# Patient Record
Sex: MALE | Race: WHITE | NOT HISPANIC OR LATINO | URBAN - METROPOLITAN AREA
[De-identification: names, ages, dates, MRNs, and addresses within clinical notes are randomized per-mention and may not be internally consistent; named-entity substitution may affect disease eponyms.]

---

## 2018-10-17 ENCOUNTER — EMERGENCY (EMERGENCY)
Facility: HOSPITAL | Age: 43
LOS: 1 days | Discharge: HOME | End: 2018-10-17
Attending: EMERGENCY MEDICINE
Payer: COMMERCIAL

## 2018-10-17 VITALS
OXYGEN SATURATION: 98 % | SYSTOLIC BLOOD PRESSURE: 139 MMHG | RESPIRATION RATE: 18 BRPM | HEART RATE: 79 BPM | TEMPERATURE: 98 F | DIASTOLIC BLOOD PRESSURE: 82 MMHG

## 2018-10-17 VITALS — HEIGHT: 74 IN | WEIGHT: 315 LBS

## 2018-10-17 LAB
ALBUMIN SERPL ELPH-MCNC: 3.6 G/DL — SIGNIFICANT CHANGE UP (ref 3.5–5)
ALP SERPL-CCNC: 86 U/L — SIGNIFICANT CHANGE UP (ref 40–120)
ALT FLD-CCNC: 25 U/L DA — SIGNIFICANT CHANGE UP (ref 10–60)
ANION GAP SERPL CALC-SCNC: 7 MMOL/L — SIGNIFICANT CHANGE UP (ref 5–17)
APPEARANCE UR: CLEAR — SIGNIFICANT CHANGE UP
AST SERPL-CCNC: 14 U/L — SIGNIFICANT CHANGE UP (ref 10–40)
BASOPHILS # BLD AUTO: 0.1 K/UL — SIGNIFICANT CHANGE UP (ref 0–0.2)
BASOPHILS NFR BLD AUTO: 1 % — SIGNIFICANT CHANGE UP (ref 0–2)
BILIRUB SERPL-MCNC: 0.3 MG/DL — SIGNIFICANT CHANGE UP (ref 0.2–1.2)
BILIRUB UR-MCNC: NEGATIVE — SIGNIFICANT CHANGE UP
BUN SERPL-MCNC: 22 MG/DL — HIGH (ref 7–18)
CALCIUM SERPL-MCNC: 8.8 MG/DL — SIGNIFICANT CHANGE UP (ref 8.4–10.5)
CHLORIDE SERPL-SCNC: 107 MMOL/L — SIGNIFICANT CHANGE UP (ref 96–108)
CO2 SERPL-SCNC: 27 MMOL/L — SIGNIFICANT CHANGE UP (ref 22–31)
COLOR SPEC: YELLOW — SIGNIFICANT CHANGE UP
CREAT SERPL-MCNC: 0.99 MG/DL — SIGNIFICANT CHANGE UP (ref 0.5–1.3)
DIFF PNL FLD: ABNORMAL
EOSINOPHIL # BLD AUTO: 0.3 K/UL — SIGNIFICANT CHANGE UP (ref 0–0.5)
EOSINOPHIL NFR BLD AUTO: 2 % — SIGNIFICANT CHANGE UP (ref 0–6)
GLUCOSE SERPL-MCNC: 95 MG/DL — SIGNIFICANT CHANGE UP (ref 70–99)
GLUCOSE UR QL: NEGATIVE — SIGNIFICANT CHANGE UP
HCT VFR BLD CALC: 46.1 % — SIGNIFICANT CHANGE UP (ref 39–50)
HGB BLD-MCNC: 14.8 G/DL — SIGNIFICANT CHANGE UP (ref 13–17)
KETONES UR-MCNC: NEGATIVE — SIGNIFICANT CHANGE UP
LEUKOCYTE ESTERASE UR-ACNC: ABNORMAL
LYMPHOCYTES # BLD AUTO: 2.9 K/UL — SIGNIFICANT CHANGE UP (ref 1–3.3)
LYMPHOCYTES # BLD AUTO: 22.7 % — SIGNIFICANT CHANGE UP (ref 13–44)
MCHC RBC-ENTMCNC: 28.8 PG — SIGNIFICANT CHANGE UP (ref 27–34)
MCHC RBC-ENTMCNC: 32.2 GM/DL — SIGNIFICANT CHANGE UP (ref 32–36)
MCV RBC AUTO: 89.7 FL — SIGNIFICANT CHANGE UP (ref 80–100)
MONOCYTES # BLD AUTO: 0.9 K/UL — SIGNIFICANT CHANGE UP (ref 0–0.9)
MONOCYTES NFR BLD AUTO: 7.2 % — SIGNIFICANT CHANGE UP (ref 2–14)
NEUTROPHILS # BLD AUTO: 8.6 K/UL — HIGH (ref 1.8–7.4)
NEUTROPHILS NFR BLD AUTO: 67 % — SIGNIFICANT CHANGE UP (ref 43–77)
NITRITE UR-MCNC: NEGATIVE — SIGNIFICANT CHANGE UP
PH UR: 6 — SIGNIFICANT CHANGE UP (ref 5–8)
PLATELET # BLD AUTO: 287 K/UL — SIGNIFICANT CHANGE UP (ref 150–400)
POTASSIUM SERPL-MCNC: 4.5 MMOL/L — SIGNIFICANT CHANGE UP (ref 3.5–5.3)
POTASSIUM SERPL-SCNC: 4.5 MMOL/L — SIGNIFICANT CHANGE UP (ref 3.5–5.3)
PROT SERPL-MCNC: 8.2 G/DL — SIGNIFICANT CHANGE UP (ref 6–8.3)
PROT UR-MCNC: 30 MG/DL
RBC # BLD: 5.14 M/UL — SIGNIFICANT CHANGE UP (ref 4.2–5.8)
RBC # FLD: 13.4 % — SIGNIFICANT CHANGE UP (ref 10.3–14.5)
SODIUM SERPL-SCNC: 141 MMOL/L — SIGNIFICANT CHANGE UP (ref 135–145)
SP GR SPEC: 1.02 — SIGNIFICANT CHANGE UP (ref 1.01–1.02)
UROBILINOGEN FLD QL: NEGATIVE — SIGNIFICANT CHANGE UP
WBC # BLD: 12.6 K/UL — HIGH (ref 3.8–10.5)
WBC # FLD AUTO: 12.6 K/UL — HIGH (ref 3.8–10.5)

## 2018-10-17 PROCEDURE — 87591 N.GONORRHOEAE DNA AMP PROB: CPT

## 2018-10-17 PROCEDURE — 87491 CHLMYD TRACH DNA AMP PROBE: CPT

## 2018-10-17 PROCEDURE — 85027 COMPLETE CBC AUTOMATED: CPT

## 2018-10-17 PROCEDURE — 87086 URINE CULTURE/COLONY COUNT: CPT

## 2018-10-17 PROCEDURE — 76870 US EXAM SCROTUM: CPT

## 2018-10-17 PROCEDURE — 99285 EMERGENCY DEPT VISIT HI MDM: CPT

## 2018-10-17 PROCEDURE — 76705 ECHO EXAM OF ABDOMEN: CPT

## 2018-10-17 PROCEDURE — 80053 COMPREHEN METABOLIC PANEL: CPT

## 2018-10-17 PROCEDURE — 96374 THER/PROPH/DIAG INJ IV PUSH: CPT

## 2018-10-17 PROCEDURE — 81001 URINALYSIS AUTO W/SCOPE: CPT

## 2018-10-17 PROCEDURE — 76705 ECHO EXAM OF ABDOMEN: CPT | Mod: 26

## 2018-10-17 PROCEDURE — 87186 SC STD MICRODIL/AGAR DIL: CPT

## 2018-10-17 PROCEDURE — 99284 EMERGENCY DEPT VISIT MOD MDM: CPT | Mod: 25

## 2018-10-17 PROCEDURE — 76870 US EXAM SCROTUM: CPT | Mod: 26

## 2018-10-17 RX ORDER — KETOROLAC TROMETHAMINE 30 MG/ML
30 SYRINGE (ML) INJECTION ONCE
Qty: 0 | Refills: 0 | Status: DISCONTINUED | OUTPATIENT
Start: 2018-10-17 | End: 2018-10-17

## 2018-10-17 RX ORDER — IBUPROFEN 200 MG
1 TABLET ORAL
Qty: 20 | Refills: 0
Start: 2018-10-17 | End: 2018-10-21

## 2018-10-17 RX ORDER — CIPROFLOXACIN LACTATE 400MG/40ML
1 VIAL (ML) INTRAVENOUS
Qty: 10 | Refills: 0
Start: 2018-10-17 | End: 2018-10-26

## 2018-10-17 RX ADMIN — Medication 30 MILLIGRAM(S): at 15:36

## 2018-10-17 NOTE — ED PROVIDER NOTE - ATTENDING CONTRIBUTION TO CARE
I conducted a face-to-face interaction with patient and I agree with NP documentation and plan.  Pt presents R testicular swelling and heaviness x 2 days  Exam:  Abd: soft/nontender, nondistended  : right testicular fullness, nontender, no  lesions, penis normal with no discharge; no apparent inguinal hernia on exam  A/P: Right testicular pain--workup shows mild leukocytosis, UA c/w UTI, testicular US with no torsion or significant pathology--GC/Chlamydia sent but clinically unlikely, therefore will treat with Levaquin, return precautions, PMD f/u

## 2018-10-17 NOTE — ED PROVIDER NOTE - OBJECTIVE STATEMENT
45 year-old 43 year-old male, history of obesity, presents with cc R testicular swelling/heaviness x 2 days. Reports RLQ pain x 2 weeks that spontaneously resolved but triggered R testicular symptoms. Associated with urinary hesitancy, dysuria and intermittent hematuria x 2 weeks. Patient reports that R testicular pain is aggravated when lifting heavy and partially alleviated when laying down. Denies fever, chills, current abdo pain, N/V, back pain, history of hernias, history of epididymitis or any other complaints. Sexually active. Works as  and lifts heavy from time to time.

## 2018-10-17 NOTE — ED PROVIDER NOTE - PROGRESS NOTE DETAILS
Significant relief of pain after Toradol. Mild elevated WBC 12.6. otherwise blood work non-concerning. UA shows signs of infection. US unremarkable no signs of torsion/epididymitis/hernia in scrotum. Low risk for GC/Chlam (cultures pending). Will cover with Levaquin. Patient will need to follow up with the urologist within 1 week and with the gen surgeon for evaluation of hernia. Pt is well appearing walking with steady gait, stable for discharge and follow up without fail with medical doctor. I had a detailed discussion with the patient and/or guardian regarding the historical points, exam findings, and any diagnostic results supporting the discharge diagnosis. Pt educated on care and need for follow up. Strict return instructions and red flag signs and symptoms discussed with patient. Questions answered. Pt shows understanding of discharge information and agrees to follow.

## 2018-10-17 NOTE — ED STATDOCS - OBJECTIVE STATEMENT
Telemedicine assessment was conducted (using real time 2 way audio-video technology) by Dr. Jesus Lemos located at [address] [place] [id number]  +++++++++++++++++++++++++++++++++++++++++++++++++++  History and Plan: 44 y/o M presents to the ED with complaints of right testicular pain. Patient reports two week history of dysuria and sensation of incomplete bladder emptying. Patient states symptoms resolved two days ago. Patient has since experienced right testicular swelling and hematuria. Patient reports resolution of right lower abdominal pain. Patient denies injury, similar episode in the past, prior hernia or any other complaints. NKDA.   Plan: Patient to be examined by provider on site. Will obtain urinalysis.     Patient seen by me in triage for initial assessment and ordering. Physician on site to follow results and further evaluate and treat patient. Telemedicine assessment was conducted (using real time 2 way audio-video technology) by Dr. Jesus Lemos located at [address] [place] [id number]  +++++++++++++++++++++++++++++++++++++++++++++++++++  History and Plan: 44 y/o M presents to the ED with complaints of right testicular pain. Patient reports two week history of dysuria and sensation of incomplete bladder emptying. Patient states symptoms resolved two days ago. Patient has since experienced right testicular swelling and hematuria. Patient reports resolution of right lower abdominal pain. Patient denies injury, similar episode in the past, prior hernia or any other complaints. NKDA.   Plan: Patient to be examined by provider on site. Will obtain urinalysis.     needs physical exam to determine if further workup needed; on site provider to evaluate patient    Patient seen by me in triage for initial assessment and ordering. Physician on site to follow results and further evaluate and treat patient.

## 2018-10-17 NOTE — ED ADULT NURSE NOTE - NSIMPLEMENTINTERV_GEN_ALL_ED
Implemented All Universal Safety Interventions:  [place] to call system. Call bell, personal items and telephone within reach. Instruct patient to call for assistance. Room bathroom lighting operational. Non-slip footwear when patient is off stretcher. Physically safe environment: no spills, clutter or unnecessary equipment. Stretcher in lowest position, wheels locked, appropriate side rails in place.

## 2018-10-17 NOTE — ED ADULT TRIAGE NOTE - CHIEF COMPLAINT QUOTE
C/o right testicular pain since y/d after lifting a laundry bag. When I urinated this morning the urine was brown at first and their was blood in it

## 2018-10-17 NOTE — ED PROVIDER NOTE - PHYSICAL EXAMINATION
No CVAT. No inguinal tenderness, lesions, lymphadenopathy or bulging (with or without coughing). Foreskin retracts easily, glans penis normal. No evidence of phimosis, paraphimosis or balanitis. Urinary meatus clear without discharge or erythema. Penile shaft without lesions, swelling or tenderness. R sided scrotal swelling/fullness without increased warmth, redness or tenderness to palpation. No induration or crepitus. Testes in normal position, not high-riding and non-tender. No localized tenderness over the upper pole of testes. Normal cremasteric reflex.

## 2018-10-17 NOTE — ED PROVIDER NOTE - MEDICAL DECISION MAKING DETAILS
43 year-old female, presents with cc R testicular pain x 2 days. Well-appearing, slightly hypertensive likely from discomfort. Based on history and exam suggestive of non-incarcerated non-strangulated hernia. Ddx: epididymitis, UTI, BPH. Plan: labs, urine, US, re-assess.

## 2018-10-18 LAB
C TRACH RRNA SPEC QL NAA+PROBE: SIGNIFICANT CHANGE UP
N GONORRHOEA RRNA SPEC QL NAA+PROBE: SIGNIFICANT CHANGE UP
SPECIMEN SOURCE: SIGNIFICANT CHANGE UP

## 2018-10-23 ENCOUNTER — APPOINTMENT (OUTPATIENT)
Dept: UROLOGY | Facility: CLINIC | Age: 43
End: 2018-10-23
Payer: COMMERCIAL

## 2018-10-23 VITALS
HEIGHT: 74 IN | RESPIRATION RATE: 18 BRPM | SYSTOLIC BLOOD PRESSURE: 140 MMHG | HEART RATE: 95 BPM | OXYGEN SATURATION: 97 % | WEIGHT: 315 LBS | BODY MASS INDEX: 40.43 KG/M2 | DIASTOLIC BLOOD PRESSURE: 98 MMHG

## 2018-10-23 DIAGNOSIS — F17.200 NICOTINE DEPENDENCE, UNSPECIFIED, UNCOMPLICATED: ICD-10-CM

## 2018-10-23 DIAGNOSIS — Z80.6 FAMILY HISTORY OF LEUKEMIA: ICD-10-CM

## 2018-10-23 PROBLEM — Z00.00 ENCOUNTER FOR PREVENTIVE HEALTH EXAMINATION: Status: ACTIVE | Noted: 2018-10-23

## 2018-10-23 LAB
ANION GAP SERPL CALC-SCNC: 13 MMOL/L
BUN SERPL-MCNC: 17 MG/DL
CALCIUM SERPL-MCNC: 9.2 MG/DL
CHLORIDE SERPL-SCNC: 104 MMOL/L
CO2 SERPL-SCNC: 25 MMOL/L
CREAT SERPL-MCNC: 1.03 MG/DL
GLUCOSE SERPL-MCNC: 103 MG/DL
POTASSIUM SERPL-SCNC: 5.2 MMOL/L
SODIUM SERPL-SCNC: 142 MMOL/L

## 2018-10-23 PROCEDURE — 99406 BEHAV CHNG SMOKING 3-10 MIN: CPT

## 2018-10-23 PROCEDURE — 99204 OFFICE O/P NEW MOD 45 MIN: CPT | Mod: 25

## 2018-10-24 LAB
APPEARANCE: ABNORMAL
BACTERIA: NEGATIVE
BILIRUBIN URINE: NEGATIVE
BLOOD URINE: NEGATIVE
COLOR: ABNORMAL
GLUCOSE QUALITATIVE U: NEGATIVE MG/DL
HYALINE CASTS: 0 /LPF
KETONES URINE: ABNORMAL
LEUKOCYTE ESTERASE URINE: NEGATIVE
MICROSCOPIC-UA: NORMAL
NITRITE URINE: NEGATIVE
PH URINE: 5
PROTEIN URINE: ABNORMAL MG/DL
PSA FREE FLD-MCNC: 10.1
PSA FREE SERPL-MCNC: 0.08 NG/ML
PSA SERPL-MCNC: 0.79 NG/ML
RED BLOOD CELLS URINE: 1 /HPF
SPECIFIC GRAVITY URINE: 1.03
SQUAMOUS EPITHELIAL CELLS: 8 /HPF
URINE COMMENTS: NORMAL
UROBILINOGEN URINE: NEGATIVE MG/DL
WHITE BLOOD CELLS URINE: 6 /HPF

## 2018-10-26 ENCOUNTER — RESULT REVIEW (OUTPATIENT)
Age: 43
End: 2018-10-26

## 2018-10-26 LAB
BACTERIA UR CULT: ABNORMAL
CORE LAB FLUID CYTOLOGY: NORMAL

## 2018-10-26 RX ORDER — AMOXICILLIN AND CLAVULANATE POTASSIUM 875; 125 MG/1; MG/1
875-125 TABLET, COATED ORAL
Qty: 6 | Refills: 0 | Status: ACTIVE | COMMUNITY
Start: 2018-10-26 | End: 1900-01-01

## 2018-10-27 ENCOUNTER — APPOINTMENT (OUTPATIENT)
Dept: CT IMAGING | Facility: IMAGING CENTER | Age: 43
End: 2018-10-27

## 2018-10-27 ENCOUNTER — OUTPATIENT (OUTPATIENT)
Dept: OUTPATIENT SERVICES | Facility: HOSPITAL | Age: 43
LOS: 1 days | End: 2018-10-27
Payer: MEDICAID

## 2018-10-27 DIAGNOSIS — Z00.8 ENCOUNTER FOR OTHER GENERAL EXAMINATION: ICD-10-CM

## 2018-10-27 PROCEDURE — 74178 CT ABD&PLV WO CNTR FLWD CNTR: CPT | Mod: 26

## 2018-10-27 PROCEDURE — 74178 CT ABD&PLV WO CNTR FLWD CNTR: CPT

## 2018-11-14 ENCOUNTER — APPOINTMENT (OUTPATIENT)
Dept: UROLOGY | Facility: CLINIC | Age: 43
End: 2018-11-14
Payer: COMMERCIAL

## 2018-11-14 VITALS
HEIGHT: 74 IN | SYSTOLIC BLOOD PRESSURE: 136 MMHG | BODY MASS INDEX: 40.43 KG/M2 | WEIGHT: 315 LBS | TEMPERATURE: 98.7 F | DIASTOLIC BLOOD PRESSURE: 70 MMHG

## 2018-11-14 DIAGNOSIS — Z87.440 PERSONAL HISTORY OF URINARY (TRACT) INFECTIONS: ICD-10-CM

## 2018-11-14 PROCEDURE — 99214 OFFICE O/P EST MOD 30 MIN: CPT | Mod: 25

## 2018-11-14 PROCEDURE — 52000 CYSTOURETHROSCOPY: CPT

## 2019-01-21 ENCOUNTER — EMERGENCY (EMERGENCY)
Facility: HOSPITAL | Age: 44
LOS: 1 days | Discharge: HOME | End: 2019-01-21
Attending: EMERGENCY MEDICINE
Payer: COMMERCIAL

## 2019-01-21 VITALS
RESPIRATION RATE: 16 BRPM | SYSTOLIC BLOOD PRESSURE: 164 MMHG | HEART RATE: 114 BPM | TEMPERATURE: 99 F | DIASTOLIC BLOOD PRESSURE: 76 MMHG | OXYGEN SATURATION: 96 %

## 2019-01-21 VITALS — HEIGHT: 75 IN | WEIGHT: 296.96 LBS

## 2019-01-21 PROCEDURE — 82962 GLUCOSE BLOOD TEST: CPT

## 2019-01-21 PROCEDURE — 10160 PNXR ASPIR ABSC HMTMA BULLA: CPT

## 2019-01-21 PROCEDURE — 99283 EMERGENCY DEPT VISIT LOW MDM: CPT

## 2019-01-21 PROCEDURE — 99283 EMERGENCY DEPT VISIT LOW MDM: CPT | Mod: 25

## 2019-01-21 RX ADMIN — Medication 300 MILLIGRAM(S): at 17:01

## 2019-01-21 NOTE — ED PROVIDER NOTE - OBJECTIVE STATEMENT
42 y/o M with PMHx of HTN and no significant PSHx presents to the ED with complaints of abscess to the L forehead x 4 days. Patient states abscess was initially a pimple that patient tried squeezing and since worsened with mild yellow drainage. Patient denies fever, eye pain, double vision, IV drug use or any other acute complaints. NKDA.

## 2019-01-21 NOTE — ED ADULT NURSE NOTE - OBJECTIVE STATEMENT
pt is a 44 y/o male w /c/o abscess on his left forehead started as apimple  he start to squeeze it and got  worse noticed today with mild yellow discharge coming out.

## 2019-01-21 NOTE — ED PROVIDER NOTE - MEDICAL DECISION MAKING DETAILS
Appearance of abscess that has mostly drained. No signs of cavernous sinus extension. Patient is afebrile, well appearing and hemodynamically stable. Will attempt to I&D to remove residual. Discharged with PO antibiotics and counseled in return precautions.

## 2019-01-21 NOTE — ED PROVIDER NOTE - PHYSICAL EXAMINATION
Afebrile, hemodynamically stable, saturating well  NAD, well appearing  Head NCAT  EOMI, no periorbital swelling, anicteric  MMM  Breathing comfortably on room air.  AAO, CN's 3-12 grossly intact  MERCEDES spontaneously, no leg cyanosis or edema  Skin warm, well perfused, no rashes or hives   2.5 cm induration with mild fluctuance and appearance of prior drainage to L forehead. Afebrile, hemodynamically stable, saturating well  NAD, well appearing  Head NCAT other than below  Pupils equal, EOMI, no periorbital swelling, anicteric  MMM  Breathing comfortably on room air  AAO, CN's 3-12 grossly intact  MERCEDES spontaneously, no leg cyanosis or edema  Skin warm, well perfused  2.5 cm induration with mild fluctuance and appearance of prior drainage to L forehead

## 2019-01-21 NOTE — ED ADULT NURSE NOTE - NSIMPLEMENTINTERV_GEN_ALL_ED
Implemented All Universal Safety Interventions:  [place] to call system. Call bell, personal items and telephone within reach. Instruct patient to call for assistance. Room bathroom lighting operational. Non-slip footwear when patient is off stretcher. Physically safe environment: no spills, clutter or unnecessary equipment. Stretcher in lowest position, wheels locked, appropriate side rails in place.

## 2019-01-21 NOTE — ED PROVIDER NOTE - PROGRESS NOTE DETAILS
ID see procedure note, Pt is well appearing walking with steady gait, stable for discharge and follow up without fail with medical doctor. I had a detailed discussion with the patient and/or guardian regarding the historical points, exam findings, and any diagnostic results supporting the discharge diagnosis. Pt educated on care and need for follow up. Strict return instructions and red flag signs and symptoms discussed with patient. Questions answered. Pt shows understanding of discharge information and agrees to follow.

## 2019-05-14 ENCOUNTER — APPOINTMENT (OUTPATIENT)
Dept: UROLOGY | Facility: CLINIC | Age: 44
End: 2019-05-14
Payer: COMMERCIAL

## 2019-05-14 VITALS — DIASTOLIC BLOOD PRESSURE: 88 MMHG | HEIGHT: 74 IN | HEART RATE: 98 BPM | SYSTOLIC BLOOD PRESSURE: 172 MMHG

## 2019-05-14 VITALS — WEIGHT: 310 LBS | BODY MASS INDEX: 39.8 KG/M2

## 2019-05-14 DIAGNOSIS — R31.0 GROSS HEMATURIA: ICD-10-CM

## 2019-05-14 DIAGNOSIS — N39.0 URINARY TRACT INFECTION, SITE NOT SPECIFIED: ICD-10-CM

## 2019-05-14 DIAGNOSIS — F17.210 NICOTINE DEPENDENCE, CIGARETTES, UNCOMPLICATED: ICD-10-CM

## 2019-05-14 PROCEDURE — 99213 OFFICE O/P EST LOW 20 MIN: CPT

## 2019-05-14 NOTE — HISTORY OF PRESENT ILLNESS
[FreeTextEntry1] : Very pleasant 44-year-old gentleman presents for followup of gross hematuria. Patient reports no difficulty urinating since last visit. On last cystoscopy, he was noted to have a widely patent urethral stricture. He denies dysuria. No flank pain or suprapubic pain. No gross hematuria. No other complaints. [Urinary Frequency] : no urinary frequency [Urinary Retention] : no urinary retention [Urinary Urgency] : no urinary urgency [Nocturia] : no nocturia [Straining] : no straining [Weak Stream] : no weak stream [Dysuria] : no dysuria [Bladder Spasm] : no bladder spasm [Hematuria - Gross] : no gross hematuria [Flank Pain] : no flank pain [Abdominal Pain] : no abdominal pain [Fever] : no fever [Fatigue] : no fatigue [Anorexia] : no anorexia [Nausea] : no nausea

## 2019-05-14 NOTE — PHYSICAL EXAM
[General Appearance - Well Developed] : well developed [General Appearance - Well Nourished] : well nourished [Normal Appearance] : normal appearance [General Appearance - In No Acute Distress] : no acute distress [Well Groomed] : well groomed [Abdomen Tenderness] : non-tender [Costovertebral Angle Tenderness] : no ~M costovertebral angle tenderness [Abdomen Soft] : soft [Urinary Bladder Findings] : the bladder was normal on palpation [] : no respiratory distress [Edema] : no peripheral edema [Exaggerated Use Of Accessory Muscles For Inspiration] : no accessory muscle use [Respiration, Rhythm And Depth] : normal respiratory rhythm and effort [Oriented To Time, Place, And Person] : oriented to person, place, and time [Affect] : the affect was normal [Mood] : the mood was normal [Not Anxious] : not anxious [Normal Station and Gait] : the gait and station were normal for the patient's age [No Palpable Adenopathy] : no palpable adenopathy [No Focal Deficits] : no focal deficits

## 2019-05-14 NOTE — ASSESSMENT
[FreeTextEntry1] : Very pleasant 44-year-old gentleman presents for followup of gross hematuria and urethral stricture\par -Patient reports urinating well without difficulties\par -Urinalysis\par -Urine culture\par -F/U in 1 year if urine studies are ok\par

## 2019-05-15 LAB
APPEARANCE: CLEAR
BACTERIA UR CULT: NORMAL
BACTERIA: NEGATIVE
BILIRUBIN URINE: NEGATIVE
BLOOD URINE: NEGATIVE
COLOR: YELLOW
GLUCOSE QUALITATIVE U: NEGATIVE
HYALINE CASTS: 1 /LPF
KETONES URINE: NEGATIVE
LEUKOCYTE ESTERASE URINE: NEGATIVE
MICROSCOPIC-UA: NORMAL
NITRITE URINE: NEGATIVE
PH URINE: 5.5
PROTEIN URINE: NORMAL
RED BLOOD CELLS URINE: 1 /HPF
SPECIFIC GRAVITY URINE: 1.03
SQUAMOUS EPITHELIAL CELLS: 3 /HPF
UROBILINOGEN URINE: NORMAL
WHITE BLOOD CELLS URINE: 1 /HPF

## 2019-05-17 LAB — URINE CYTOLOGY: NORMAL

## 2019-06-27 ENCOUNTER — EMERGENCY (EMERGENCY)
Facility: HOSPITAL | Age: 44
LOS: 1 days | Discharge: HOME | End: 2019-06-27
Attending: EMERGENCY MEDICINE
Payer: COMMERCIAL

## 2019-06-27 VITALS
WEIGHT: 313.94 LBS | TEMPERATURE: 99 F | SYSTOLIC BLOOD PRESSURE: 138 MMHG | OXYGEN SATURATION: 97 % | HEIGHT: 74 IN | RESPIRATION RATE: 16 BRPM | HEART RATE: 91 BPM | DIASTOLIC BLOOD PRESSURE: 87 MMHG

## 2019-06-27 VITALS
OXYGEN SATURATION: 99 % | RESPIRATION RATE: 16 BRPM | HEART RATE: 90 BPM | SYSTOLIC BLOOD PRESSURE: 145 MMHG | DIASTOLIC BLOOD PRESSURE: 89 MMHG | TEMPERATURE: 99 F

## 2019-06-27 PROBLEM — I10 ESSENTIAL (PRIMARY) HYPERTENSION: Chronic | Status: ACTIVE | Noted: 2019-01-21

## 2019-06-27 PROCEDURE — 99283 EMERGENCY DEPT VISIT LOW MDM: CPT | Mod: 25

## 2019-06-27 PROCEDURE — 10060 I&D ABSCESS SIMPLE/SINGLE: CPT | Mod: RT

## 2019-06-27 PROCEDURE — 10060 I&D ABSCESS SIMPLE/SINGLE: CPT

## 2019-06-27 PROCEDURE — 99283 EMERGENCY DEPT VISIT LOW MDM: CPT

## 2019-06-27 RX ORDER — L.ACIDOPH/B.ANIMALIS/B.LONGUM 15B CELL
1 CAPSULE ORAL
Qty: 10 | Refills: 0
Start: 2019-06-27 | End: 2019-07-06

## 2019-06-27 RX ADMIN — Medication 300 MILLIGRAM(S): at 11:20

## 2019-06-27 NOTE — ED PROCEDURE NOTE - ATTENDING CONTRIBUTION TO CARE
Scott: I&D for cellulitis with abscess. of right forearm.  care instructions given.  must have wound checked

## 2019-06-27 NOTE — ED ADULT NURSE NOTE - NSIMPLEMENTINTERV_GEN_ALL_ED
Implemented All Universal Safety Interventions:  [place] to call system. Call bell, personal items and telephone within reach. Instruct patient to call for assistance. Room bathroom lighting operational. Non-slip footwear when patient is off stretcher. Physically safe environment: no spills, clutter or unnecessary equipment. Stretcher in lowest position, wheels locked, appropriate side rails in place.

## 2019-06-27 NOTE — ED PROVIDER NOTE - OBJECTIVE STATEMENT
Patient is a 44 y.o. male with a significant PMHx of HTN presents to the ED with right forearm cellulitis pain for the past week. He says it started with a pimple, he was scratching it, then redness developed and he took some medicine at home without relief. Denies drug use, fever, or chills. He does admit to some clear discharge at the initial puncture lashell. NKDA.

## 2019-06-27 NOTE — ED PROVIDER NOTE - CLINICAL SUMMARY MEDICAL DECISION MAKING FREE TEXT BOX
44 y.o. male with cellulitis with abscess formation. Will give clindamycin, perform I&D, and instruct patient to observe for progression. Close return precaution.

## 2019-06-27 NOTE — ED PROVIDER NOTE - PROGRESS NOTE DETAILS
hare:   warm compress 3x/day 20 mins each, tylenol 650mg every 4 hrs and/or motrin 600mg every 6 hrs.  take clindamycin with probiotic.  if worsens with meds after 48 hrs or severe discomfort return to ed.

## 2020-12-21 PROBLEM — N39.0 ACUTE UTI: Status: RESOLVED | Noted: 2018-10-23 | Resolved: 2020-12-21

## 2021-04-14 ENCOUNTER — EMERGENCY (EMERGENCY)
Facility: HOSPITAL | Age: 46
LOS: 1 days | Discharge: LEFT BEFORE DISCHARGE | End: 2021-04-14
Admitting: EMERGENCY MEDICINE
Payer: COMMERCIAL

## 2021-04-14 VITALS
TEMPERATURE: 99 F | OXYGEN SATURATION: 98 % | DIASTOLIC BLOOD PRESSURE: 89 MMHG | SYSTOLIC BLOOD PRESSURE: 145 MMHG | WEIGHT: 250 LBS | HEART RATE: 102 BPM | RESPIRATION RATE: 16 BRPM

## 2021-04-14 DIAGNOSIS — R41.82 ALTERED MENTAL STATUS, UNSPECIFIED: ICD-10-CM

## 2021-04-14 DIAGNOSIS — F16.90 HALLUCINOGEN USE, UNSPECIFIED, UNCOMPLICATED: ICD-10-CM

## 2021-04-14 DIAGNOSIS — Z53.21 PROCEDURE AND TREATMENT NOT CARRIED OUT DUE TO PATIENT LEAVING PRIOR TO BEING SEEN BY HEALTH CARE PROVIDER: ICD-10-CM

## 2021-04-14 PROCEDURE — 82962 GLUCOSE BLOOD TEST: CPT

## 2021-04-14 PROCEDURE — L9991: CPT

## 2021-04-14 PROCEDURE — 99283 EMERGENCY DEPT VISIT LOW MDM: CPT
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
## 2021-09-21 NOTE — ED ADULT NURSE NOTE - NSFALLRSKASSESSTYPE_ED_ALL_ED
Chiquita from PT called and states an urgency for pt to be seen. Gait is worse, and more frequent falls. Pt has an oct 20 appt. She's requesting if at all possible to be seen sooner.    Chiquita PT  [phone]    Or call pt   
May see FRANCISCO J Mehta on 10/6 at [facility] for 80 min appt. Please call patient to schedule  
Noted.//TV    
Please see results of recent US.  Chiquita from PT states she thinks PT will be on hold as there hasn't been much improvement.  Patient is wondering what next step will be.  
Pt has been called and has scheduled for 10/6/21.  
Routed to FRANCISCO J Mehta: will you see patient?  
Initial (On Arrival)

## 2023-04-11 ENCOUNTER — EMERGENCY (EMERGENCY)
Facility: HOSPITAL | Age: 48
LOS: 1 days | Discharge: HOME | End: 2023-04-11
Attending: EMERGENCY MEDICINE
Payer: COMMERCIAL

## 2023-04-11 VITALS
OXYGEN SATURATION: 98 % | WEIGHT: 315 LBS | DIASTOLIC BLOOD PRESSURE: 82 MMHG | SYSTOLIC BLOOD PRESSURE: 116 MMHG | HEIGHT: 74 IN | RESPIRATION RATE: 18 BRPM | TEMPERATURE: 99 F | HEART RATE: 116 BPM

## 2023-04-11 LAB
ALBUMIN SERPL ELPH-MCNC: 3.2 G/DL — LOW (ref 3.5–5)
ALP SERPL-CCNC: 81 U/L — SIGNIFICANT CHANGE UP (ref 40–120)
ALT FLD-CCNC: 43 U/L DA — SIGNIFICANT CHANGE UP (ref 10–60)
ANION GAP SERPL CALC-SCNC: 3 MMOL/L — LOW (ref 5–17)
AST SERPL-CCNC: 20 U/L — SIGNIFICANT CHANGE UP (ref 10–40)
BASOPHILS # BLD AUTO: 0.06 K/UL — SIGNIFICANT CHANGE UP (ref 0–0.2)
BASOPHILS NFR BLD AUTO: 0.6 % — SIGNIFICANT CHANGE UP (ref 0–2)
BILIRUB SERPL-MCNC: 0.4 MG/DL — SIGNIFICANT CHANGE UP (ref 0.2–1.2)
BUN SERPL-MCNC: 19 MG/DL — HIGH (ref 7–18)
CALCIUM SERPL-MCNC: 9.3 MG/DL — SIGNIFICANT CHANGE UP (ref 8.4–10.5)
CHLORIDE SERPL-SCNC: 106 MMOL/L — SIGNIFICANT CHANGE UP (ref 96–108)
CO2 SERPL-SCNC: 32 MMOL/L — HIGH (ref 22–31)
CREAT SERPL-MCNC: 1.13 MG/DL — SIGNIFICANT CHANGE UP (ref 0.5–1.3)
EGFR: 79 ML/MIN/1.73M2 — SIGNIFICANT CHANGE UP
EOSINOPHIL # BLD AUTO: 0.18 K/UL — SIGNIFICANT CHANGE UP (ref 0–0.5)
EOSINOPHIL NFR BLD AUTO: 1.8 % — SIGNIFICANT CHANGE UP (ref 0–6)
GLUCOSE SERPL-MCNC: 115 MG/DL — HIGH (ref 70–99)
HCT VFR BLD CALC: 50.1 % — HIGH (ref 39–50)
HGB BLD-MCNC: 15.7 G/DL — SIGNIFICANT CHANGE UP (ref 13–17)
HIV 1 & 2 AB SERPL IA.RAPID: SIGNIFICANT CHANGE UP
IMM GRANULOCYTES NFR BLD AUTO: 1.3 % — HIGH (ref 0–0.9)
LYMPHOCYTES # BLD AUTO: 1.95 K/UL — SIGNIFICANT CHANGE UP (ref 1–3.3)
LYMPHOCYTES # BLD AUTO: 19.6 % — SIGNIFICANT CHANGE UP (ref 13–44)
MCHC RBC-ENTMCNC: 29.8 PG — SIGNIFICANT CHANGE UP (ref 27–34)
MCHC RBC-ENTMCNC: 31.3 GM/DL — LOW (ref 32–36)
MCV RBC AUTO: 95.2 FL — SIGNIFICANT CHANGE UP (ref 80–100)
MONOCYTES # BLD AUTO: 0.79 K/UL — SIGNIFICANT CHANGE UP (ref 0–0.9)
MONOCYTES NFR BLD AUTO: 7.9 % — SIGNIFICANT CHANGE UP (ref 2–14)
NEUTROPHILS # BLD AUTO: 6.84 K/UL — SIGNIFICANT CHANGE UP (ref 1.8–7.4)
NEUTROPHILS NFR BLD AUTO: 68.8 % — SIGNIFICANT CHANGE UP (ref 43–77)
NRBC # BLD: 0 /100 WBCS — SIGNIFICANT CHANGE UP (ref 0–0)
PLATELET # BLD AUTO: 215 K/UL — SIGNIFICANT CHANGE UP (ref 150–400)
POTASSIUM SERPL-MCNC: 4.3 MMOL/L — SIGNIFICANT CHANGE UP (ref 3.5–5.3)
POTASSIUM SERPL-SCNC: 4.3 MMOL/L — SIGNIFICANT CHANGE UP (ref 3.5–5.3)
PROT SERPL-MCNC: 7.6 G/DL — SIGNIFICANT CHANGE UP (ref 6–8.3)
RBC # BLD: 5.26 M/UL — SIGNIFICANT CHANGE UP (ref 4.2–5.8)
RBC # FLD: 14.5 % — SIGNIFICANT CHANGE UP (ref 10.3–14.5)
SODIUM SERPL-SCNC: 141 MMOL/L — SIGNIFICANT CHANGE UP (ref 135–145)
WBC # BLD: 9.95 K/UL — SIGNIFICANT CHANGE UP (ref 3.8–10.5)
WBC # FLD AUTO: 9.95 K/UL — SIGNIFICANT CHANGE UP (ref 3.8–10.5)

## 2023-04-11 PROCEDURE — 99284 EMERGENCY DEPT VISIT MOD MDM: CPT

## 2023-04-11 PROCEDURE — 85025 COMPLETE CBC W/AUTO DIFF WBC: CPT

## 2023-04-11 PROCEDURE — 86703 HIV-1/HIV-2 1 RESULT ANTBDY: CPT

## 2023-04-11 PROCEDURE — 99283 EMERGENCY DEPT VISIT LOW MDM: CPT

## 2023-04-11 PROCEDURE — 36415 COLL VENOUS BLD VENIPUNCTURE: CPT

## 2023-04-11 PROCEDURE — 80053 COMPREHEN METABOLIC PANEL: CPT

## 2023-04-11 PROCEDURE — 82962 GLUCOSE BLOOD TEST: CPT

## 2023-04-11 RX ORDER — VALACYCLOVIR 500 MG/1
1 TABLET, FILM COATED ORAL
Qty: 18 | Refills: 0
Start: 2023-04-11 | End: 2023-04-16

## 2023-04-11 RX ORDER — VALACYCLOVIR 500 MG/1
1000 TABLET, FILM COATED ORAL ONCE
Refills: 0 | Status: COMPLETED | OUTPATIENT
Start: 2023-04-11 | End: 2023-04-11

## 2023-04-11 RX ADMIN — Medication 60 MILLIGRAM(S): at 19:21

## 2023-04-11 RX ADMIN — VALACYCLOVIR 1000 MILLIGRAM(S): 500 TABLET, FILM COATED ORAL at 20:28

## 2023-04-11 NOTE — ED PROVIDER NOTE - PATIENT PORTAL LINK FT
You can access the FollowMyHealth Patient Portal offered by [facility] by registering at the following website: [email or web address]. By joining [facility]’s FollowMyHealth portal, you will also be able to view your health information using other applications (apps) compatible with our system.

## 2023-04-11 NOTE — ED PROVIDER NOTE - NEUROLOGICAL, MLM
Facial asymmetry at rest with right facial weakness including forehead (no movement of forehead), able to close right eyelids with minimal effort, normal sensation;   Alert and oriented, no focal deficits, no motor or sensory deficits., normal speech/coordination/gait

## 2023-04-11 NOTE — ED PROVIDER NOTE - CLINICAL SUMMARY MEDICAL DECISION MAKING FREE TEXT BOX
Pt with evidence of Bell's Palsy, Grade V on House Brackman scale, will check labs with HIV screen, start predisone and valacyclovir. Pt with evidence of Bell's Palsy, Grade V on House Brackman scale, will check labs with HIV screen, start prednisone and valacyclovir.

## 2023-04-11 NOTE — ED ADULT NURSE NOTE - OBJECTIVE STATEMENT
Pt is here for facial droop at the left corner of the mouth. Pt stated he noticed about 24 hours ago. Pt denied no other weaknesses. AAOx4. GCS15.

## 2023-04-11 NOTE — ED PROVIDER NOTE - OBJECTIVE STATEMENT
48-year-old man, history of hypertension, since about 4:30 PM yesterday complains of weakness of the right side of his face that has gradually worsened.  He denies any headache/vomiting/fever/dizziness/syncope/extremity weakness/extremity numbness.  He has never had this before.  He says that he was drooling from the right side of his mouth.  He was able to close his eyes but not raises right side of his forehead.

## 2023-04-11 NOTE — ED ADULT TRIAGE NOTE - AS TEMP SITE
Problem: Goal/Outcome  Goal: Goal Outcome Summary  Outcome: Therapy, progress toward functional goals as expected  Pt is alert and oriented x 3, denies pain or discomfort. He has right sided weakness related to diagnosis. He needs assist of one staff for transfer and ambulation using a quad cane and transfer belt. He also needs tray set up for eating related to right side weakness. We will continue to assist with activity of daily livings.         oral

## 2023-04-11 NOTE — ED PROVIDER NOTE - NSFOLLOWUPINSTRUCTIONS_ED_ALL_ED_FT
English    Holloway's Palsy, Adult    Bell's palsy is a short-term inability to move muscles in a part of the face. The inability to move, also called paralysis, results from inflammation or compression of the seventh cranial nerve. This nerve travels along the skull and under the ear to the side of the face. This nerve is responsible for facial movements that include blinking, closing the eyes, smiling, and frowning.    What are the causes?  The exact cause of this condition is not known. It may be caused by an infection from a virus, such as the chickenpox (herpes zoster), John–Barr, or mumps virus.    What increases the risk?  You are more likely to develop this condition if:  You are pregnant.  You have diabetes.  You have had a recent infection in your nose, throat, or airways.  You have a weakened body defense system (immune system).  You have had a facial injury, such as a fracture.  You have a family history of Bell's palsy.  What are the signs or symptoms?  Symptoms of this condition include:  Weakness on one side of the face.  Drooping eyelid and corner of the mouth.  Excessive tearing in one eye.  Difficulty closing the eyelid.  Dry eye.  Drooling.  Dry mouth.  Changes in taste.  Change in facial appearance.  Pain behind one ear.  Ringing in one or both ears.  Sensitivity to sound in one ear.  Facial twitching.  Headache.  Impaired speech.  Dizziness.  Difficulty eating or drinking.  Most of the time, only one side of the face is affected. In rare cases, Bell's palsy may affect the whole face.    How is this diagnosed?  This condition is diagnosed based on:  Your symptoms.  Your medical history.  A physical exam.  You may also have to see health care providers who specialize in disorders of the nerves (neurologist) or diseases and conditions of the eye (ophthalmologist). You may have tests, such as:  A test to check for nerve damage (electromyogram).  Imaging studies, such as a CT scan or an MRI.  Blood tests.  How is this treated?  This condition affects every person differently. Sometimes symptoms go away without treatment within a couple weeks. If treatment is needed, it varies from person to person. The goal of treatment is to reduce inflammation and protect the eye from damage. Treatment for Bell's palsy may include:  Medicines, such as:  Steroids to reduce swelling and inflammation.  Antiviral medicines.  Pain relievers, including aspirin, acetaminophen, or ibuprofen.  Eye drops or ointment to keep your eye moist.  Eye protection, if you cannot close your eye.  Exercises or massage to regain muscle strength and function (physical therapy).  Follow these instructions at home:    Take over-the-counter and prescription medicines only as told by your health care provider.  If your eye is affected:  Keep your eye moist with eye drops or ointment as told by your health care provider.  Follow instructions for eye care and protection as told by your health care provider.  Do any physical therapy exercises as told by your health care provider.  Keep all follow-up visits. This is important.  Contact a health care provider if:  You have a fever or chills.  Your symptoms do not get better within 2–3 weeks, or your symptoms get worse.  Your eye is red, irritated, or painful.  You have new symptoms.  Get help right away if:  You have weakness or numbness in a part of your body other than your face.  You have trouble swallowing.  You develop neck pain or stiffness.  You develop dizziness or shortness of breath.  Summary  Bell's palsy is a short-term inability to move muscles in a part of the face. The inability to move results from inflammation or compression of the facial nerve.  This condition affects every person differently. Sometimes symptoms go away without treatment within a couple weeks.  If treatment is needed, it varies from person to person. The goal of treatment is to reduce inflammation and protect the eye from damage.  Contact your health care provider if your symptoms do not get better within 2–3 weeks, or your symptoms get worse.  This information is not intended to replace advice given to you by your health care provider. Make sure you discuss any questions you have with your health care provider.    Document Revised: 09/16/2021 Document Reviewed: 09/16/2021  Elsevier Patient Education © 2023 [employer]

## 2024-12-25 PROBLEM — F10.90 ALCOHOL USE: Status: INACTIVE | Noted: 2018-10-23

## 2025-07-21 ENCOUNTER — APPOINTMENT (OUTPATIENT)
Dept: PAIN MANAGEMENT | Facility: CLINIC | Age: 50
End: 2025-07-21
Payer: MEDICAID

## 2025-07-21 VITALS — BODY MASS INDEX: 42.66 KG/M2 | HEIGHT: 72 IN | WEIGHT: 315 LBS

## 2025-07-21 DIAGNOSIS — E66.9 OBESITY, UNSPECIFIED: ICD-10-CM

## 2025-07-21 DIAGNOSIS — Z86.79 PERSONAL HISTORY OF OTHER DISEASES OF THE CIRCULATORY SYSTEM: ICD-10-CM

## 2025-07-21 PROCEDURE — 73562 X-RAY EXAM OF KNEE 3: CPT | Mod: 50

## 2025-07-21 PROCEDURE — 20610 DRAIN/INJ JOINT/BURSA W/O US: CPT | Mod: LT

## 2025-07-21 PROCEDURE — 99204 OFFICE O/P NEW MOD 45 MIN: CPT | Mod: 25

## 2025-07-21 RX ORDER — NAPROXEN 500 MG/1
500 TABLET ORAL
Qty: 60 | Refills: 1 | Status: ACTIVE | COMMUNITY
Start: 2025-07-21 | End: 1900-01-01

## 2025-07-21 RX ORDER — CYCLOBENZAPRINE HYDROCHLORIDE 5 MG/1
5 TABLET, FILM COATED ORAL TWICE DAILY
Qty: 60 | Refills: 1 | Status: ACTIVE | COMMUNITY
Start: 2025-07-21 | End: 1900-01-01

## 2025-08-16 PROBLEM — M25.561 CHRONIC PAIN OF BOTH KNEES: Status: ACTIVE | Noted: 2025-07-21

## 2025-08-16 PROBLEM — M54.16 LUMBAR RADICULITIS: Status: ACTIVE | Noted: 2025-07-21

## 2025-08-18 ENCOUNTER — APPOINTMENT (OUTPATIENT)
Dept: PAIN MANAGEMENT | Facility: CLINIC | Age: 50
End: 2025-08-18

## 2025-08-18 DIAGNOSIS — M25.562 PAIN IN RIGHT KNEE: ICD-10-CM

## 2025-08-18 DIAGNOSIS — G89.29 PAIN IN RIGHT KNEE: ICD-10-CM

## 2025-08-18 DIAGNOSIS — M25.561 PAIN IN RIGHT KNEE: ICD-10-CM

## 2025-08-28 ENCOUNTER — APPOINTMENT (OUTPATIENT)
Dept: PAIN MANAGEMENT | Facility: CLINIC | Age: 50
End: 2025-08-28

## 2025-08-31 PROBLEM — M17.0 PRIMARY OSTEOARTHRITIS OF BOTH KNEES: Status: ACTIVE | Noted: 2025-08-27

## 2025-08-31 PROBLEM — Z86.79 HISTORY OF HYPERTENSION: Status: RESOLVED | Noted: 2025-07-21 | Resolved: 2025-08-31

## 2025-08-31 PROBLEM — E66.9 OBESITY (BMI 30-39.9): Status: RESOLVED | Noted: 2025-07-21 | Resolved: 2025-08-31

## 2025-09-02 ENCOUNTER — APPOINTMENT (OUTPATIENT)
Dept: PAIN MANAGEMENT | Facility: CLINIC | Age: 50
End: 2025-09-02

## 2025-09-02 DIAGNOSIS — Z86.79 PERSONAL HISTORY OF OTHER DISEASES OF THE CIRCULATORY SYSTEM: ICD-10-CM

## 2025-09-02 DIAGNOSIS — M54.16 RADICULOPATHY, LUMBAR REGION: ICD-10-CM

## 2025-09-02 DIAGNOSIS — E66.9 OBESITY, UNSPECIFIED: ICD-10-CM

## 2025-09-02 DIAGNOSIS — M17.0 BILATERAL PRIMARY OSTEOARTHRITIS OF KNEE: ICD-10-CM
